# Patient Record
Sex: FEMALE | Race: WHITE | NOT HISPANIC OR LATINO | Employment: OTHER | URBAN - METROPOLITAN AREA
[De-identification: names, ages, dates, MRNs, and addresses within clinical notes are randomized per-mention and may not be internally consistent; named-entity substitution may affect disease eponyms.]

---

## 2018-04-05 ENCOUNTER — TELEPHONE (OUTPATIENT)
Dept: OBSTETRICS AND GYNECOLOGY | Facility: CLINIC | Age: 83
End: 2018-04-05

## 2018-04-05 NOTE — TELEPHONE ENCOUNTER
Left voice mail notifying this must go through med/legal. Gave phone number  so she may contact them directly

## 2018-04-05 NOTE — TELEPHONE ENCOUNTER
----- Message from Joesph Hylton sent at 4/5/2018  3:12 PM CDT -----  Contact: Dali quinn/Jerri Mcnally is calling to see if she can do a deposition with Dr Maria Eugenia Florence did pt's implant back in 2012   Call Back#[phone]  Thanks

## 2021-02-17 ENCOUNTER — TELEPHONE (OUTPATIENT)
Dept: UROLOGY | Facility: CLINIC | Age: 86
End: 2021-02-17

## 2022-10-26 ENCOUNTER — TELEPHONE (OUTPATIENT)
Dept: NEUROLOGY | Facility: CLINIC | Age: 87
End: 2022-10-26
Payer: MEDICARE

## 2022-10-26 NOTE — TELEPHONE ENCOUNTER
----- Message from Ana Croft sent at 10/26/2022  9:33 AM CDT -----  Contact: Zoe at [phone]  Type: Needs Medical Advice  Who Called:  Zoe at [facility] Call Back Number: [phone]  Additional Information: Zoe is calling the office to R/S appt. Please call back and advise.

## 2022-10-26 NOTE — TELEPHONE ENCOUNTER
----- Message from Jose Alberto Garcia sent at 10/26/2022  3:17 PM CDT -----  Regarding: ret call  Type:  Patient Returning Call    Who Called:  Zoe at [facility]    Who Left Message for Patient:  Aide    Does the patient know what this is regarding?:  yes    Best Call Back Number:  [phone]    Additional Information:

## 2022-11-22 PROBLEM — H81.90 LABYRINTHINE DISORDER: Status: ACTIVE | Noted: 2018-05-10

## 2022-11-22 PROBLEM — K52.9 INFLAMMATORY BOWEL DISEASE: Status: ACTIVE | Noted: 2019-12-12

## 2022-11-22 PROBLEM — I25.10 CORONARY ARTERIOSCLEROSIS: Status: ACTIVE | Noted: 2020-05-29

## 2022-11-22 PROBLEM — E03.9 HYPOTHYROIDISM: Status: ACTIVE | Noted: 2017-06-02

## 2022-11-22 PROBLEM — F41.1 GENERALIZED ANXIETY DISORDER: Status: ACTIVE | Noted: 2017-06-02

## 2022-11-22 PROBLEM — I34.0 MITRAL VALVE REGURGITATION: Status: ACTIVE | Noted: 2017-06-02

## 2022-11-22 PROBLEM — E78.5 HYPERLIPIDEMIA: Status: ACTIVE | Noted: 2021-01-07

## 2022-11-22 PROBLEM — G62.9 NEUROPATHY: Status: ACTIVE | Noted: 2018-10-29

## 2022-11-22 PROBLEM — J84.10 FIBROSIS OF LUNG: Status: ACTIVE | Noted: 2020-05-29

## 2022-11-22 PROBLEM — E87.6 HYPOKALEMIA: Status: ACTIVE | Noted: 2019-12-19

## 2023-02-17 PROBLEM — I48.91 A-FIB: Status: ACTIVE | Noted: 2023-02-17

## 2023-02-17 PROBLEM — F03.90 DEMENTIA: Status: ACTIVE | Noted: 2023-02-17

## 2023-02-17 PROBLEM — I95.9 HYPOTENSION: Status: ACTIVE | Noted: 2023-02-17

## 2023-02-17 PROBLEM — L89.90 DECUBITUS ULCER: Status: ACTIVE | Noted: 2023-02-17

## 2023-02-17 PROBLEM — N39.0 UTI (URINARY TRACT INFECTION): Status: ACTIVE | Noted: 2023-02-17

## 2023-05-22 PROBLEM — N39.0 UTI (URINARY TRACT INFECTION): Status: RESOLVED | Noted: 2023-02-17 | Resolved: 2023-05-22
